# Patient Record
Sex: FEMALE | Race: WHITE
[De-identification: names, ages, dates, MRNs, and addresses within clinical notes are randomized per-mention and may not be internally consistent; named-entity substitution may affect disease eponyms.]

---

## 2017-09-14 ENCOUNTER — HOSPITAL ENCOUNTER (EMERGENCY)
Dept: HOSPITAL 80 - FED | Age: 32
Discharge: HOME | End: 2017-09-14
Payer: COMMERCIAL

## 2017-09-14 VITALS
SYSTOLIC BLOOD PRESSURE: 128 MMHG | HEART RATE: 60 BPM | OXYGEN SATURATION: 96 % | DIASTOLIC BLOOD PRESSURE: 65 MMHG | TEMPERATURE: 97.9 F

## 2017-09-14 VITALS — RESPIRATION RATE: 18 BRPM
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
## 2017-09-14 DIAGNOSIS — K59.00: ICD-10-CM

## 2017-09-14 DIAGNOSIS — S30.0XXA: Primary | ICD-10-CM
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
## 2018-10-01 ENCOUNTER — HOSPITAL ENCOUNTER (OUTPATIENT)
Dept: HOSPITAL 80 - FIMAGING | Age: 33
End: 2018-10-01
Attending: OBSTETRICS & GYNECOLOGY
Payer: COMMERCIAL

## 2018-10-01 DIAGNOSIS — Z3A.12: ICD-10-CM

## 2018-10-01 DIAGNOSIS — O30.041: Primary | ICD-10-CM
